# Patient Record
Sex: MALE | Race: WHITE
[De-identification: names, ages, dates, MRNs, and addresses within clinical notes are randomized per-mention and may not be internally consistent; named-entity substitution may affect disease eponyms.]

---

## 2018-12-19 ENCOUNTER — HOSPITAL ENCOUNTER (INPATIENT)
Dept: HOSPITAL 92 - ERS | Age: 83
LOS: 3 days | Discharge: HOME | DRG: 247 | End: 2018-12-22
Attending: INTERNAL MEDICINE | Admitting: INTERNAL MEDICINE
Payer: SELF-PAY

## 2018-12-19 VITALS — BODY MASS INDEX: 25.5 KG/M2

## 2018-12-19 DIAGNOSIS — E78.00: ICD-10-CM

## 2018-12-19 DIAGNOSIS — E03.9: ICD-10-CM

## 2018-12-19 DIAGNOSIS — I82.890: ICD-10-CM

## 2018-12-19 DIAGNOSIS — Z79.52: ICD-10-CM

## 2018-12-19 DIAGNOSIS — I21.09: Primary | ICD-10-CM

## 2018-12-19 DIAGNOSIS — Z79.82: ICD-10-CM

## 2018-12-19 DIAGNOSIS — I25.5: ICD-10-CM

## 2018-12-19 DIAGNOSIS — Z79.02: ICD-10-CM

## 2018-12-19 LAB
ALBUMIN SERPL BCG-MCNC: 3.8 G/DL (ref 3.4–4.8)
ALP SERPL-CCNC: 56 U/L (ref 40–150)
ALT SERPL W P-5'-P-CCNC: 22 U/L (ref 8–55)
ANION GAP SERPL CALC-SCNC: 15 MMOL/L (ref 10–20)
APTT PPP: 173.5 SEC (ref 22.9–36.1)
AST SERPL-CCNC: 95 U/L (ref 5–34)
BASOPHILS # BLD AUTO: 0.1 THOU/UL (ref 0–0.2)
BASOPHILS NFR BLD AUTO: 0.5 % (ref 0–1)
BILIRUB SERPL-MCNC: 0.5 MG/DL (ref 0.2–1.2)
BUN SERPL-MCNC: 26 MG/DL (ref 8.4–25.7)
CALCIUM SERPL-MCNC: 8.9 MG/DL (ref 7.8–10.44)
CHLORIDE SERPL-SCNC: 106 MMOL/L (ref 98–107)
CK MB SERPL-MCNC: 193.8 NG/ML (ref 0–6.6)
CK MB SERPL-MCNC: 326.3 NG/ML (ref 0–6.6)
CO2 SERPL-SCNC: 20 MMOL/L (ref 23–31)
CREAT CL PREDICTED SERPL C-G-VRATE: 0 ML/MIN (ref 70–130)
EOSINOPHIL # BLD AUTO: 0.1 THOU/UL (ref 0–0.7)
EOSINOPHIL NFR BLD AUTO: 0.5 % (ref 0–10)
GLOBULIN SER CALC-MCNC: 2.9 G/DL (ref 2.4–3.5)
GLUCOSE SERPL-MCNC: 123 MG/DL (ref 83–110)
HGB BLD-MCNC: 13.3 G/DL (ref 14–18)
INR PPP: 1
LYMPHOCYTES # BLD: 1.8 THOU/UL (ref 1.2–3.4)
LYMPHOCYTES NFR BLD AUTO: 15.6 % (ref 21–51)
MCH RBC QN AUTO: 32 PG (ref 27–31)
MCV RBC AUTO: 95.5 FL (ref 78–98)
MONOCYTES # BLD AUTO: 0.5 THOU/UL (ref 0.11–0.59)
MONOCYTES NFR BLD AUTO: 4.4 % (ref 0–10)
NEUTROPHILS # BLD AUTO: 9 THOU/UL (ref 1.4–6.5)
NEUTROPHILS NFR BLD AUTO: 79 % (ref 42–75)
PLATELET # BLD AUTO: 184 THOU/UL (ref 130–400)
POTASSIUM SERPL-SCNC: 4.2 MMOL/L (ref 3.5–5.1)
PROTHROMBIN TIME: 13.7 SEC (ref 12–14.7)
RBC # BLD AUTO: 4.16 MILL/UL (ref 4.7–6.1)
SODIUM SERPL-SCNC: 137 MMOL/L (ref 136–145)
TROPONIN I SERPL DL<=0.01 NG/ML-MCNC: 341.82 NG/ML (ref ?–0.03)
TROPONIN I SERPL DL<=0.01 NG/ML-MCNC: 80.69 NG/ML (ref ?–0.03)
WBC # BLD AUTO: 11.4 THOU/UL (ref 4.8–10.8)

## 2018-12-19 PROCEDURE — 93460 R&L HRT ART/VENTRICLE ANGIO: CPT

## 2018-12-19 PROCEDURE — 80053 COMPREHEN METABOLIC PANEL: CPT

## 2018-12-19 PROCEDURE — 4A023N7 MEASUREMENT OF CARDIAC SAMPLING AND PRESSURE, LEFT HEART, PERCUTANEOUS APPROACH: ICD-10-PCS | Performed by: INTERNAL MEDICINE

## 2018-12-19 PROCEDURE — 80048 BASIC METABOLIC PNL TOTAL CA: CPT

## 2018-12-19 PROCEDURE — C9606 PERC D-E COR REVASC W AMI S: HCPCS

## 2018-12-19 PROCEDURE — C1769 GUIDE WIRE: HCPCS

## 2018-12-19 PROCEDURE — 85730 THROMBOPLASTIN TIME PARTIAL: CPT

## 2018-12-19 PROCEDURE — C1874 STENT, COATED/COV W/DEL SYS: HCPCS

## 2018-12-19 PROCEDURE — 92941 PRQ TRLML REVSC TOT OCCL AMI: CPT

## 2018-12-19 PROCEDURE — 02C03ZZ EXTIRPATION OF MATTER FROM CORONARY ARTERY, ONE ARTERY, PERCUTANEOUS APPROACH: ICD-10-PCS | Performed by: INTERNAL MEDICINE

## 2018-12-19 PROCEDURE — 80061 LIPID PANEL: CPT

## 2018-12-19 PROCEDURE — 93010 ELECTROCARDIOGRAM REPORT: CPT

## 2018-12-19 PROCEDURE — 85347 COAGULATION TIME ACTIVATED: CPT

## 2018-12-19 PROCEDURE — 027034Z DILATION OF CORONARY ARTERY, ONE ARTERY WITH DRUG-ELUTING INTRALUMINAL DEVICE, PERCUTANEOUS APPROACH: ICD-10-PCS | Performed by: INTERNAL MEDICINE

## 2018-12-19 PROCEDURE — 84484 ASSAY OF TROPONIN QUANT: CPT

## 2018-12-19 PROCEDURE — 36415 COLL VENOUS BLD VENIPUNCTURE: CPT

## 2018-12-19 PROCEDURE — 3E07317 INTRODUCTION OF OTHER THROMBOLYTIC INTO CORONARY ARTERY, PERCUTANEOUS APPROACH: ICD-10-PCS | Performed by: INTERNAL MEDICINE

## 2018-12-19 PROCEDURE — 93005 ELECTROCARDIOGRAM TRACING: CPT

## 2018-12-19 PROCEDURE — 71045 X-RAY EXAM CHEST 1 VIEW: CPT

## 2018-12-19 PROCEDURE — 85025 COMPLETE CBC W/AUTO DIFF WBC: CPT

## 2018-12-19 PROCEDURE — 82553 CREATINE MB FRACTION: CPT

## 2018-12-19 PROCEDURE — 93798 PHYS/QHP OP CAR RHAB W/ECG: CPT

## 2018-12-19 PROCEDURE — 93306 TTE W/DOPPLER COMPLETE: CPT

## 2018-12-19 PROCEDURE — B2151ZZ FLUOROSCOPY OF LEFT HEART USING LOW OSMOLAR CONTRAST: ICD-10-PCS | Performed by: INTERNAL MEDICINE

## 2018-12-19 PROCEDURE — 83880 ASSAY OF NATRIURETIC PEPTIDE: CPT

## 2018-12-19 PROCEDURE — 85610 PROTHROMBIN TIME: CPT

## 2018-12-19 NOTE — RAD
AP CHEST:

 

Date:  12/19/18 

 

HISTORY:  

Chest pain. 

 

FINDINGS:

The lungs are clear. No infiltrate seen. Heart size upper normal. Vascular markings upper normal with
out evidence of edema or effusion. 

 

IMPRESSION: 

No acute process apparent. 

 

 

POS: TPC

## 2018-12-19 NOTE — HP
CHIEF COMPLAINT:  Chest pain.



HISTORY OF PRESENT ILLNESS:  Mr. Farah is a pleasant 85-year-old white gentleman who

comes to the hospital, transferred from the [place] outpatient facility for an

anterior ST-elevation MI.  Mr. Farah felt some chest burning earlier this morning, so

he decided to go see his primary care doctor, Dr. Mcdonald, saw him, did an EKG and

found ST elevations anteriorly, so he gave him Plavix, aspirin, and TNK, and

transferred him over for further care.  On my evaluation, he was chest pain free,

however, his STs were still elevated, so he was taken directly to the cath lab for

further care.  We chose to do a radial approach given his recent TNK administration.

 We found this 90% stenosis of the proximal LAD, which was successfully opened and

stented.  He did very well.  He received a drug-eluting stent.  He is not having any

chest pain, tightness, or pressure.  No shortness of breath. 



PAST MEDICAL HISTORY:  He takes a thyroid medication.  He is unsure of what it is

for.  He does not know if it is for hyper or hypothyroidism. 



PAST SURGICAL HISTORY:  Bilateral cataract surgery.



SOCIAL HISTORY:  No alcohol, tobacco, or drugs.



FAMILY HISTORY:  No significant early coronary artery disease.  His father had a CVA

in his 80s. 



OUTPATIENT MEDICATIONS:  Thyroid medication, unknown if it is methimazole or

thyroxine. 



ALLERGIES:  NO KNOWN DRUG ALLERGIES.



REVIEW OF SYSTEMS:  12-point review of systems was done and was found to be negative

other than stated in the history of present illness. 



PHYSICAL EXAMINATION:

VITAL SIGNS:  Temperature 97.6, pulse 66, respiratory rate 18, saturating 100% on 2

L nasal cannula.  Blood pressure 130/85. 

GENERAL:  Awake, alert, and oriented x3, in no distress. 

HEENT:  Normocephalic, atraumatic. 

NECK:  Supple. 

LUNGS:  Clear. 

CARDIOVASCULAR:  S1, S2.  No S3 or S4.  No murmurs. 

ABDOMEN:  Soft.  Positive bowel sounds. 

EXTREMITIES:  No edema. 

SKIN:  Warm and dry.



LABORATORY WORK:  Reviewed.  CBC with white count 11, hemoglobin 13, hematocrit 39,

platelet count 184.  Coags reviewed.  Chemistries were reviewed.  Initial troponin

was 80.9, up to 341.8 now with a CK-MB of 193, initially up to 326.  His BUN was 22,

creatinine was 1.4, GFR 47.  Normal sodium and potassium.  AST was high at 95, ALT

22, alkaline phosphatase was normal, albumin of 3.8. 



EKG was reviewed.



ASSESSMENT:  

1. Anterior ST-elevation myocardial infarction.

2. Status post thrombolytics.

3. Status post drug-eluting stent to the LAD.



PLAN:  

1. Continue dual antiplatelet therapy with Plavix and aspirin.

2. We will do a high dose statin and we will start a beta blocker and ACE inhibitor

as blood pressure allow in the next 24 to 48 hours. 

3. Echocardiogram to be done.

4. Full code.

5. PPI for stress ulcer prophylaxis.

6. Lovenox subcu for DVT prophylaxis.







Job ID:  [id number]

## 2018-12-19 NOTE — EKG
Test Reason : 

Blood Pressure : ***/*** mmHG

Vent. Rate : 067 BPM     Atrial Rate : 067 BPM

   P-R Int : 172 ms          QRS Dur : 126 ms

    QT Int : 424 ms       P-R-T Axes : 039 -49 -12 degrees

   QTc Int : 448 ms

 

Sinus Rhythm with frequent premature ventricular contractions. 

Left axis deviation

Non-specific intra-ventricular conduction block

Septal infarct , age undetermined

Abnormal ECG

 

Confirmed by DARIO MG ([id number]) on 12/19/2018 6:05:04 PM

 

Referred By:  DORINDA           Confirmed By:DARIO MG

## 2018-12-20 LAB
ALBUMIN SERPL BCG-MCNC: 3.5 G/DL (ref 3.4–4.8)
ALP SERPL-CCNC: 45 U/L (ref 40–150)
ALT SERPL W P-5'-P-CCNC: 34 U/L (ref 8–55)
ANION GAP SERPL CALC-SCNC: 15 MMOL/L (ref 10–20)
AST SERPL-CCNC: 164 U/L (ref 5–34)
BASOPHILS # BLD AUTO: 0 THOU/UL (ref 0–0.2)
BASOPHILS NFR BLD AUTO: 0.4 % (ref 0–1)
BILIRUB SERPL-MCNC: 0.7 MG/DL (ref 0.2–1.2)
BUN SERPL-MCNC: 22 MG/DL (ref 8.4–25.7)
CALCIUM SERPL-MCNC: 8.6 MG/DL (ref 7.8–10.44)
CHD RISK SERPL-RTO: 4 (ref ?–4.5)
CHLORIDE SERPL-SCNC: 108 MMOL/L (ref 98–107)
CHOLEST SERPL-MCNC: 156 MG/DL
CK MB SERPL-MCNC: 184.2 NG/ML (ref 0–6.6)
CO2 SERPL-SCNC: 22 MMOL/L (ref 23–31)
COMM CRITICAL RESULTS DOC: (no result)
COMM CRITICAL RESULTS DOC: (no result)
CREAT CL PREDICTED SERPL C-G-VRATE: 44 ML/MIN (ref 70–130)
EOSINOPHIL # BLD AUTO: 0.2 THOU/UL (ref 0–0.7)
EOSINOPHIL NFR BLD AUTO: 1.4 % (ref 0–10)
GLOBULIN SER CALC-MCNC: 2.5 G/DL (ref 2.4–3.5)
GLUCOSE SERPL-MCNC: 108 MG/DL (ref 83–110)
HDLC SERPL-MCNC: 39 MG/DL
HGB BLD-MCNC: 12.6 G/DL (ref 14–18)
LDLC SERPL CALC-MCNC: 95 MG/DL
LYMPHOCYTES # BLD: 2.3 THOU/UL (ref 1.2–3.4)
LYMPHOCYTES NFR BLD AUTO: 21.4 % (ref 21–51)
MCH RBC QN AUTO: 33 PG (ref 27–31)
MCV RBC AUTO: 95 FL (ref 78–98)
MONOCYTES # BLD AUTO: 1 THOU/UL (ref 0.11–0.59)
MONOCYTES NFR BLD AUTO: 9 % (ref 0–10)
NEUTROPHILS # BLD AUTO: 7.3 THOU/UL (ref 1.4–6.5)
NEUTROPHILS NFR BLD AUTO: 67.8 % (ref 42–75)
PLATELET # BLD AUTO: 173 THOU/UL (ref 130–400)
POTASSIUM SERPL-SCNC: 3.8 MMOL/L (ref 3.5–5.1)
RBC # BLD AUTO: 3.83 MILL/UL (ref 4.7–6.1)
SODIUM SERPL-SCNC: 141 MMOL/L (ref 136–145)
TRIGL SERPL-MCNC: 109 MG/DL (ref ?–150)
TROPONIN I SERPL DL<=0.01 NG/ML-MCNC: 128.1 NG/ML (ref ?–0.03)
WBC # BLD AUTO: 10.7 THOU/UL (ref 4.8–10.8)

## 2018-12-20 NOTE — PDOC.CTH
Cardiology Progress Note





- Subjective





Doing better. No chest pain, tightness, pressure. 





- Objective


 Vital Signs











  Temp Pulse Pulse Pulse Resp BP BP


 


 12/20/18 19:15  97.7 F  69    16  


 


 12/20/18 17:55  97.9 F  67    16  


 


 12/20/18 16:00  99.2 F      


 


 12/20/18 12:00  97.7 F      


 


 12/20/18 09:50    72  67   111/87  138/73














  BP Pulse Ox


 


 12/20/18 19:15  141/75 H  94 L


 


 12/20/18 17:55  151/87 H  94 L


 


 12/20/18 16:00  


 


 12/20/18 12:00  


 


 12/20/18 09:50  








 











Admit Weight                   163 lb 5.8 oz


 


Weight                         163 lb 5.8 oz














 











 12/19/18 12/20/18 12/21/18





 06:59 06:59 06:59


 


Intake Total  465 780


 


Output Total  1625 1000


 


Balance  -1160 -220














- Physical Examination


General/Neuro: alert & oriented x3, NAD


Neck: no JVD present


Lungs: CTA, unlabored respirations


Heart: RRR


Abdomen: NT/ND


Extremities: other: (no edema)





- Telemetry


Telemetry Rhythm: NSR





- Labs


Result Diagrams: 


 12/20/18 04:12





 12/20/18 04:12


 Troponin/CKMB











CK-MB (CK-2)  184.2 ng/mL (0-6.6)  H*  12/20/18  00:26    


 


Troponin I  128.096 ng/mL (< 0.028)  H*  12/20/18  00:26    














- Assessment/Plan





1. Acute anterior STEMI


2. s/p TPA and then DINORA to the LAD. 





PLAN:


- Will transition to Telemtry.


- Continue ELVER


- Large MI based on troponins and CKMB.


- Will add low dose Coreg.

## 2018-12-21 LAB
ANION GAP SERPL CALC-SCNC: 12 MMOL/L (ref 10–20)
BASOPHILS # BLD AUTO: 0 THOU/UL (ref 0–0.2)
BASOPHILS NFR BLD AUTO: 0.3 % (ref 0–1)
BUN SERPL-MCNC: 19 MG/DL (ref 8.4–25.7)
CALCIUM SERPL-MCNC: 8.3 MG/DL (ref 7.8–10.44)
CHLORIDE SERPL-SCNC: 106 MMOL/L (ref 98–107)
CO2 SERPL-SCNC: 25 MMOL/L (ref 23–31)
CREAT CL PREDICTED SERPL C-G-VRATE: 45 ML/MIN (ref 70–130)
EOSINOPHIL # BLD AUTO: 0.2 THOU/UL (ref 0–0.7)
EOSINOPHIL NFR BLD AUTO: 2.2 % (ref 0–10)
GLUCOSE SERPL-MCNC: 107 MG/DL (ref 83–110)
HGB BLD-MCNC: 12.6 G/DL (ref 14–18)
LYMPHOCYTES # BLD: 2 THOU/UL (ref 1.2–3.4)
LYMPHOCYTES NFR BLD AUTO: 21.8 % (ref 21–51)
MCH RBC QN AUTO: 32.6 PG (ref 27–31)
MCV RBC AUTO: 94.8 FL (ref 78–98)
MONOCYTES # BLD AUTO: 0.9 THOU/UL (ref 0.11–0.59)
MONOCYTES NFR BLD AUTO: 9.6 % (ref 0–10)
NEUTROPHILS # BLD AUTO: 6.2 THOU/UL (ref 1.4–6.5)
NEUTROPHILS NFR BLD AUTO: 66.1 % (ref 42–75)
PLATELET # BLD AUTO: 170 THOU/UL (ref 130–400)
POTASSIUM SERPL-SCNC: 3.9 MMOL/L (ref 3.5–5.1)
RBC # BLD AUTO: 3.86 MILL/UL (ref 4.7–6.1)
SODIUM SERPL-SCNC: 139 MMOL/L (ref 136–145)
WBC # BLD AUTO: 9.3 THOU/UL (ref 4.8–10.8)

## 2018-12-21 NOTE — PDOC.CTH
Cardiology Progress Note





- Subjective





No new issues. No chest pain, tightness ,pressure, SOB. He has been walking 

around without issues. 





- Objective


 Vital Signs











  Temp Pulse Resp BP Pulse Ox


 


 12/21/18 08:00  98.3 F  93  18  115/68  92 L


 


 12/21/18 04:00  98.4 F  78  16  99/70  93 L








 











Admit Weight                   163 lb 5.8 oz


 


Weight                         163 lb 5.8 oz














 











 12/20/18 12/21/18 12/22/18





 06:59 06:59 06:59


 


Intake Total 465 1030 


 


Output Total 1625 1000 


 


Balance -1160 30 














- Physical Examination


General/Neuro: alert & oriented x3, NAD


Neck: no JVD present


Lungs: CTA, unlabored respirations


Heart: RRR


Abdomen: NT/ND


Extremities: other: (no edema)





- Telemetry


Telemetry Rhythm: NSR





- Labs


Result Diagrams: 


 12/21/18 04:29





 12/21/18 04:29


 Troponin/CKMB











CK-MB (CK-2)  184.2 ng/mL (0-6.6)  H*  12/20/18  00:26    


 


Troponin I  128.096 ng/mL (< 0.028)  H*  12/20/18  00:26    














- Assessment/Plan





1. Acute anterior STEMI


2. s/p TPA and then DINORA to the LAD. 





PLAN:


- Echo pending.


- Continue ELVER


- Large MI based on troponins and CKMB.


- Cannot add ACEI due to borderline low BP.


- Tolerating low dose coreg. 


- Continue high dose statins.

## 2018-12-22 VITALS — SYSTOLIC BLOOD PRESSURE: 116 MMHG | DIASTOLIC BLOOD PRESSURE: 75 MMHG | TEMPERATURE: 98.2 F

## 2018-12-22 LAB
ANION GAP SERPL CALC-SCNC: 13 MMOL/L (ref 10–20)
BASOPHILS # BLD AUTO: 0 THOU/UL (ref 0–0.2)
BASOPHILS NFR BLD AUTO: 0.2 % (ref 0–1)
BUN SERPL-MCNC: 22 MG/DL (ref 8.4–25.7)
CALCIUM SERPL-MCNC: 8.5 MG/DL (ref 7.8–10.44)
CHLORIDE SERPL-SCNC: 104 MMOL/L (ref 98–107)
CO2 SERPL-SCNC: 27 MMOL/L (ref 23–31)
CREAT CL PREDICTED SERPL C-G-VRATE: 38 ML/MIN (ref 70–130)
EOSINOPHIL # BLD AUTO: 0.2 THOU/UL (ref 0–0.7)
EOSINOPHIL NFR BLD AUTO: 2.2 % (ref 0–10)
GLUCOSE SERPL-MCNC: 110 MG/DL (ref 83–110)
HGB BLD-MCNC: 12.9 G/DL (ref 14–18)
LYMPHOCYTES # BLD: 1.8 THOU/UL (ref 1.2–3.4)
LYMPHOCYTES NFR BLD AUTO: 18.1 % (ref 21–51)
MCH RBC QN AUTO: 33.3 PG (ref 27–31)
MCV RBC AUTO: 95.3 FL (ref 78–98)
MONOCYTES # BLD AUTO: 1 THOU/UL (ref 0.11–0.59)
MONOCYTES NFR BLD AUTO: 10 % (ref 0–10)
NEUTROPHILS # BLD AUTO: 7 THOU/UL (ref 1.4–6.5)
NEUTROPHILS NFR BLD AUTO: 69.4 % (ref 42–75)
PLATELET # BLD AUTO: 171 THOU/UL (ref 130–400)
POTASSIUM SERPL-SCNC: 3.9 MMOL/L (ref 3.5–5.1)
RBC # BLD AUTO: 3.87 MILL/UL (ref 4.7–6.1)
SODIUM SERPL-SCNC: 140 MMOL/L (ref 136–145)
WBC # BLD AUTO: 10.1 THOU/UL (ref 4.8–10.8)

## 2018-12-23 NOTE — DIS
DATE OF ADMISSION:  12/19/2018



DATE OF DISCHARGE:  12/22/2018



DISCHARGE DIAGNOSES:  

1. Acute anterior ST-elevation myocardial infarction, treated with tPA in 
[place]

and placement of drug-eluting stent in the left anterior descending. 

2. Ischemic cardiomyopathy with ejection fraction of 30% to 35% with anterior 
and

apical wall akinesis.  Discharged with a Lifevest.

3. 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

4. Hypercholesterolemia.

5. Hypothyroidism.



DISCHARGE MEDICATIONS:  

1. Levothyroxine 0.025 mg daily.

2. Eliquis 2.5 mg b.i.d.

3. Aspirin 81 daily.

4. Plavix 75 mg q.a.m.

5. Atorvastatin 80 mg daily.

6. Carvedilol 3.125 b.i.d.

7. Nitroglycerin 0.4 mg p.r.n.

8. Pantoprazole 40 mg daily.



DISCHARGE DISPOSITION:  The patient will follow up with Dr. Acevedo.



HOSPITAL COURSE:  Mr. Farah presented in [place] with chest pain, he had

ST-elevation anteriorly.  He was given Plavix, aspirin, and TNK and transferred 
for

further care.  When he arrived, he was chest pain free, however, his ST segments

were still elevated.  He was taken directly to the cath lab.  He was cathed 
through a

radial approach.  He had 90% stenosis of the proximal LAD and Synergy 3.0 x 12 
mm

stent was placed. 



Echocardiogram revealed ejection fraction of 30% to 35% with akinesis of the 
apex,

anterior septal, and anterior wall.  There was a small laminated thrombus at the

apex.  Mitral annular calcification, mild mitral regurgitation, aortic valve

sclerosis, and mild tricuspid regurgitation.  He was therefore placed on 
Eliquis 

because of the apical thrombus.  



After his first dose of Eliquis, later that evening he began to have left leg 
pain,

pain in his knee radiating to his toe.  His wife stated that he has severe left 
knee

arthritis and has had problems in the past and is contemplating knee 
replacement.

However, the following day, this was totally resolved and he was symptom free.  
Mr. Farah felt that the Eliquis was a cause of this.  However, with resolution of the

pain, it is doubtful that there was some type of a bleeding complication that 
caused

the pain.  He was offered that he stay another night to continue to monitor this

situation.  However, he is quite insistent that he leave.  He was encouraged to 
be

more active since he has been very sedentary since he has been here. 







Job ID:  [id number]



MTDD

## 2018-12-23 NOTE — DIS
DATE OF ADMISSION:  12/19/2018



DATE OF DISCHARGE:  12/22/2018



ADDENDUM:  Due to ejection fraction of 30-35%, Mr. Farah was fitted with a LifeVest

prior to discharge. 







Job ID:  [id number]